# Patient Record
Sex: MALE | Race: WHITE | URBAN - METROPOLITAN AREA
[De-identification: names, ages, dates, MRNs, and addresses within clinical notes are randomized per-mention and may not be internally consistent; named-entity substitution may affect disease eponyms.]

---

## 2017-02-07 ENCOUNTER — INPATIENT (INPATIENT)
Facility: HOSPITAL | Age: 82
LOS: 7 days | Discharge: HOME | DRG: 301 | End: 2017-02-15
Attending: INTERNAL MEDICINE | Admitting: HOSPITALIST
Payer: MEDICARE

## 2017-02-07 VITALS — HEART RATE: 82 BPM | DIASTOLIC BLOOD PRESSURE: 78 MMHG | SYSTOLIC BLOOD PRESSURE: 122 MMHG | RESPIRATION RATE: 16 BRPM

## 2017-02-07 DIAGNOSIS — Z95.1 PRESENCE OF AORTOCORONARY BYPASS GRAFT: Chronic | ICD-10-CM

## 2017-02-07 LAB
ALBUMIN SERPL ELPH-MCNC: 2.8 G/DL — LOW (ref 3.3–5)
ALP SERPL-CCNC: 80 U/L — SIGNIFICANT CHANGE UP (ref 40–120)
ALT FLD-CCNC: 12 U/L RC — SIGNIFICANT CHANGE UP (ref 10–45)
ANION GAP SERPL CALC-SCNC: 12 MMOL/L — SIGNIFICANT CHANGE UP (ref 5–17)
APPEARANCE UR: CLEAR — SIGNIFICANT CHANGE UP
APTT BLD: 46 SEC — HIGH (ref 27.5–37.4)
AST SERPL-CCNC: 16 U/L — SIGNIFICANT CHANGE UP (ref 10–40)
BASOPHILS # BLD AUTO: 0 K/UL — SIGNIFICANT CHANGE UP (ref 0–0.2)
BASOPHILS NFR BLD AUTO: 0.1 % — SIGNIFICANT CHANGE UP (ref 0–2)
BILIRUB SERPL-MCNC: 0.2 MG/DL — SIGNIFICANT CHANGE UP (ref 0.2–1.2)
BILIRUB UR-MCNC: NEGATIVE — SIGNIFICANT CHANGE UP
BUN SERPL-MCNC: 24 MG/DL — HIGH (ref 7–23)
CALCIUM SERPL-MCNC: 8.7 MG/DL — SIGNIFICANT CHANGE UP (ref 8.4–10.5)
CHLORIDE SERPL-SCNC: 99 MMOL/L — SIGNIFICANT CHANGE UP (ref 96–108)
CO2 SERPL-SCNC: 24 MMOL/L — SIGNIFICANT CHANGE UP (ref 22–31)
COLOR SPEC: SIGNIFICANT CHANGE UP
CREAT SERPL-MCNC: 0.74 MG/DL — SIGNIFICANT CHANGE UP (ref 0.5–1.3)
DIFF PNL FLD: NEGATIVE — SIGNIFICANT CHANGE UP
EOSINOPHIL # BLD AUTO: 0.1 K/UL — SIGNIFICANT CHANGE UP (ref 0–0.5)
EOSINOPHIL NFR BLD AUTO: 1.6 % — SIGNIFICANT CHANGE UP (ref 0–6)
GLUCOSE SERPL-MCNC: 201 MG/DL — HIGH (ref 70–99)
GLUCOSE UR QL: NEGATIVE — SIGNIFICANT CHANGE UP
HCT VFR BLD CALC: 28 % — LOW (ref 39–50)
HGB BLD-MCNC: 9.6 G/DL — LOW (ref 13–17)
INR BLD: 3.14 RATIO — HIGH (ref 0.88–1.16)
KETONES UR-MCNC: NEGATIVE — SIGNIFICANT CHANGE UP
LEUKOCYTE ESTERASE UR-ACNC: NEGATIVE — SIGNIFICANT CHANGE UP
LYMPHOCYTES # BLD AUTO: 0.9 K/UL — LOW (ref 1–3.3)
LYMPHOCYTES # BLD AUTO: 9.8 % — LOW (ref 13–44)
MCHC RBC-ENTMCNC: 30.4 PG — SIGNIFICANT CHANGE UP (ref 27–34)
MCHC RBC-ENTMCNC: 34.3 GM/DL — SIGNIFICANT CHANGE UP (ref 32–36)
MCV RBC AUTO: 88.5 FL — SIGNIFICANT CHANGE UP (ref 80–100)
MONOCYTES # BLD AUTO: 0.4 K/UL — SIGNIFICANT CHANGE UP (ref 0–0.9)
MONOCYTES NFR BLD AUTO: 4.9 % — SIGNIFICANT CHANGE UP (ref 2–14)
NEUTROPHILS # BLD AUTO: 7.7 K/UL — HIGH (ref 1.8–7.4)
NEUTROPHILS NFR BLD AUTO: 83.6 % — HIGH (ref 43–77)
NITRITE UR-MCNC: NEGATIVE — SIGNIFICANT CHANGE UP
PH UR: 6.5 — SIGNIFICANT CHANGE UP (ref 4.8–8)
PLATELET # BLD AUTO: 254 K/UL — SIGNIFICANT CHANGE UP (ref 150–400)
POTASSIUM SERPL-MCNC: 4.4 MMOL/L — SIGNIFICANT CHANGE UP (ref 3.5–5.3)
POTASSIUM SERPL-SCNC: 4.4 MMOL/L — SIGNIFICANT CHANGE UP (ref 3.5–5.3)
PROT SERPL-MCNC: 6.6 G/DL — SIGNIFICANT CHANGE UP (ref 6–8.3)
PROT UR-MCNC: NEGATIVE — SIGNIFICANT CHANGE UP
PROTHROM AB SERPL-ACNC: 34.3 SEC — HIGH (ref 10–13.1)
RBC # BLD: 3.17 M/UL — LOW (ref 4.2–5.8)
RBC # FLD: 15.2 % — HIGH (ref 10.3–14.5)
RBC CASTS # UR COMP ASSIST: SIGNIFICANT CHANGE UP /HPF (ref 0–2)
SODIUM SERPL-SCNC: 135 MMOL/L — SIGNIFICANT CHANGE UP (ref 135–145)
SP GR SPEC: 1.01 — SIGNIFICANT CHANGE UP (ref 1.01–1.02)
UROBILINOGEN FLD QL: NEGATIVE — SIGNIFICANT CHANGE UP
WBC # BLD: 9.2 K/UL — SIGNIFICANT CHANGE UP (ref 3.8–10.5)
WBC # FLD AUTO: 9.2 K/UL — SIGNIFICANT CHANGE UP (ref 3.8–10.5)
WBC UR QL: SIGNIFICANT CHANGE UP /HPF (ref 0–5)

## 2017-02-07 PROCEDURE — 73620 X-RAY EXAM OF FOOT: CPT | Mod: 26,LT

## 2017-02-07 PROCEDURE — 71010: CPT | Mod: 26

## 2017-02-07 PROCEDURE — 93010 ELECTROCARDIOGRAM REPORT: CPT

## 2017-02-07 PROCEDURE — 99285 EMERGENCY DEPT VISIT HI MDM: CPT | Mod: 25

## 2017-02-07 RX ORDER — ACETAMINOPHEN 500 MG
975 TABLET ORAL ONCE
Qty: 0 | Refills: 0 | Status: COMPLETED | OUTPATIENT
Start: 2017-02-07 | End: 2017-02-07

## 2017-02-07 RX ADMIN — Medication 975 MILLIGRAM(S): at 13:16

## 2017-02-07 NOTE — PHYSICAL THERAPY INITIAL EVALUATION ADULT - DISCHARGE DISPOSITION, PT EVAL
Subacute Rehab. if pt is going home, pt will require assist for all funtional mobility and home PT for gait and strength improvements./rehabilitation facility

## 2017-02-07 NOTE — ED PROVIDER NOTE - ATTENDING CONTRIBUTION TO CARE
------------ATTENDING NOTE------------   88 yo M sent to ED by home visiting aide, pt d/c from rehab after L hip fx 11/17, slept in wheelchair overnight as friend slept in his bed, had urinated while asleep ("that happens often"), asymptomatic w/o complaints this AM or in ED, sent to ED as home visiting nurse "would not look at wound", having SW dora, given missed dose Acetaminophen -->  - Jacky Stoll MD   ------------------------------------------------------------------------------------- ------------ATTENDING NOTE------------   88 yo M sent to ED by home visiting aide, pt d/c from rehab after L hip fx 11/17, slept in wheelchair overnight as friend slept in his bed, had urinated while asleep ("that happens often"), asymptomatic w/o complaints this AM or in ED, sent to ED as home visiting nurse "would not look at wound", having SW eval, given missed dose Acetaminophen --> ED Sign Out 1800 pending final PT recs w/ SW recs for likely d/c home w/ continued tx.  - Jacky Stoll MD   -------------------------------------------------------------------------------------

## 2017-02-07 NOTE — ED PROVIDER NOTE - PROGRESS NOTE DETAILS
I have received sign out on this patient, briefly: 89yo M, h/o DM, HTN, CAD, recent admission (11/2016) for L hip fx s/p ORIF, discharged from  rehab few days ago, now presenting to ED for inability to ambulate at home; plan per endorsing physician: obtain SW and PT/OT consult for additional home services. PT recommends return to  rehab for strengthening, not safe for discharge home with available services (no additional services as per ).  Pt reassessed, reports having pain in the left leg: found on exam to have large necrotic heel ulceration (dry), with minimal surrounding erythema.  Podiatry paged for consultation and xrays ordered, as well as labs.  Will admit to medicine service.  -Bob podiatry evaluated, reports: stable dry gangrene, no debriedment needed at present. -Bob podiatry evaluated, reports: stable dry gangrene, no debriedment needed at present.  to be admitted to medicine service -Bob

## 2017-02-07 NOTE — PHYSICAL THERAPY INITIAL EVALUATION ADULT - ACTIVE RANGE OF MOTION EXAMINATION, REHAB EVAL
bilateral upper extremity Active ROM was WFL (within functional limits)/deficits as listed below/Right LE Active ROM was WFL (within functional limits)/LLE limited to 1/2 available ROM grossly

## 2017-02-07 NOTE — PHYSICAL THERAPY INITIAL EVALUATION ADULT - ADDITIONAL COMMENTS
This is an 89 year old who recently was DC from rehab facility. prior to that pt was living with wife in a private home 13 steps to negotiate + hand rail. prior to the injury 11/14 hip Fx pt was independent with mobility, however since that injury pt has required assist with mobility. Pt states "wasn't walking much at rehab". unclear whether this is the pt's baseline now or whether pt can rehab to become more independent with his mobility. Pt was also indepndented with ADL's prior to his injuries.

## 2017-02-07 NOTE — PHYSICAL THERAPY INITIAL EVALUATION ADULT - PERTINENT HX OF CURRENT PROBLEM, REHAB EVAL
Pt arrived to ER from home after being discharged from rehab after hip replacement.  Pt was found by VNS sitting in wheelchair.  Pt states the visiting nurse did not check the patients wound.  Pt with blackened pressure ulcer to L heel and opened dime size sore to posterior foot.  Pt UA sent, pt turned and positioned, pt aaox3, pt with foot placed on pillow and elevated.

## 2017-02-07 NOTE — ED ADULT NURSE NOTE - OBJECTIVE STATEMENT
Pt arrived to ER from home after being discharged from rehab after hip replacement.  Pt was found by VNS sitting in wheelchair.  Pt states the visiting nurse did not check the patients wound.  Pt with blackened pressure ulcer to L heel and opened dime size sore to posterior foot.  Pt UA sent, pt turned and positioned, pt aaox3, pt with foot placed on pillow and elevated.  Pt with no appropriate home care arranged as per EMS,  jovan mancia speaking to daughter and involved.

## 2017-02-07 NOTE — PHYSICAL THERAPY INITIAL EVALUATION ADULT - CRITERIA FOR SKILLED THERAPEUTIC INTERVENTIONS
anticipated equipment needs at discharge/predicted duration of therapy intervention/therapy frequency/impairments found/rehab potential/functional limitations in following categories/anticipated discharge recommendation/risk reduction/prevention

## 2017-02-07 NOTE — PHYSICAL THERAPY INITIAL EVALUATION ADULT - RANGE OF MOTION EXAMINATION, REHAB EVAL
bilateral upper extremity ROM was WFL (within functional limits)/Right LE ROM was WFL (within functional limits)/deficits as listed below/LLE limited to 1/2 available ROM grossly

## 2017-02-07 NOTE — PHYSICAL THERAPY INITIAL EVALUATION ADULT - GENERAL OBSERVATIONS, REHAB EVAL
Pt encountered in the ED, + IV lock, + wound at L MTP plantar surface doctor at bedside, states WBAT to LLE

## 2017-02-07 NOTE — ED PROVIDER NOTE - PHYSICAL EXAMINATION
Overall Well Appearing, NAD;  Symm Facies, No external signs trauma, PERRL 3mm, (-)Pallor, Anicteric, MMM;  No JVD/Bruits or stridor;  RRR w/o m/g/r;   CTAB w/o w/r/r;   Abd soft, nt/nd, +bs;  No CVAT;  No edema/calf tender;  Healing L ant shin wound;  AOX3, Normal speech, normal strength/sensation Overall Well Appearing, NAD;  Symm Facies, No external signs trauma, PERRL 3mm, (-)Pallor, Anicteric, MMM;  No JVD/Bruits or stridor;  RRR w/o m/g/r;   CTAB w/o w/r/r;   Abd soft, nt/nd, +bs;  No CVAT;  No edema/calf tender;  Healing L ant shin wound;  AOX3, Normal speech, normal strength/sensation, ambulatory w/ walker; L foot w/ healing ulcer w/ granulation w/o cellulitic changes

## 2017-02-08 DIAGNOSIS — E11.9 TYPE 2 DIABETES MELLITUS WITHOUT COMPLICATIONS: ICD-10-CM

## 2017-02-08 DIAGNOSIS — E78.5 HYPERLIPIDEMIA, UNSPECIFIED: ICD-10-CM

## 2017-02-08 DIAGNOSIS — Z65.9 PROBLEM RELATED TO UNSPECIFIED PSYCHOSOCIAL CIRCUMSTANCES: ICD-10-CM

## 2017-02-08 DIAGNOSIS — R53.81 OTHER MALAISE: ICD-10-CM

## 2017-02-08 DIAGNOSIS — I25.10 ATHEROSCLEROTIC HEART DISEASE OF NATIVE CORONARY ARTERY WITHOUT ANGINA PECTORIS: ICD-10-CM

## 2017-02-08 DIAGNOSIS — R23.4 CHANGES IN SKIN TEXTURE: ICD-10-CM

## 2017-02-08 DIAGNOSIS — R26.81 UNSTEADINESS ON FEET: ICD-10-CM

## 2017-02-08 LAB
HBA1C BLD-MCNC: 6.2 % — HIGH (ref 4–5.6)
TSH SERPL-MCNC: 1.29 UIU/ML — SIGNIFICANT CHANGE UP (ref 0.27–4.2)
VIT B12 SERPL-MCNC: 232 PG/ML — LOW (ref 243–894)
VIT D25+D1,25 OH+D1,25 PNL SERPL-MCNC: 16.7 PG/ML — LOW (ref 19.9–79.3)

## 2017-02-08 PROCEDURE — 73502 X-RAY EXAM HIP UNI 2-3 VIEWS: CPT | Mod: 26,LT

## 2017-02-08 PROCEDURE — 99223 1ST HOSP IP/OBS HIGH 75: CPT | Mod: AI

## 2017-02-08 PROCEDURE — 73552 X-RAY EXAM OF FEMUR 2/>: CPT | Mod: 26,LT

## 2017-02-08 RX ORDER — DEXTROSE 50 % IN WATER 50 %
25 SYRINGE (ML) INTRAVENOUS ONCE
Qty: 0 | Refills: 0 | Status: DISCONTINUED | OUTPATIENT
Start: 2017-02-08 | End: 2017-02-15

## 2017-02-08 RX ORDER — AMLODIPINE BESYLATE 2.5 MG/1
5 TABLET ORAL DAILY
Qty: 0 | Refills: 0 | Status: DISCONTINUED | OUTPATIENT
Start: 2017-02-08 | End: 2017-02-15

## 2017-02-08 RX ORDER — ASPIRIN/CALCIUM CARB/MAGNESIUM 324 MG
81 TABLET ORAL DAILY
Qty: 0 | Refills: 0 | Status: DISCONTINUED | OUTPATIENT
Start: 2017-02-08 | End: 2017-02-15

## 2017-02-08 RX ORDER — INSULIN LISPRO 100/ML
VIAL (ML) SUBCUTANEOUS
Qty: 0 | Refills: 0 | Status: DISCONTINUED | OUTPATIENT
Start: 2017-02-08 | End: 2017-02-15

## 2017-02-08 RX ORDER — CILOSTAZOL 100 MG/1
50 TABLET ORAL
Qty: 0 | Refills: 0 | Status: DISCONTINUED | OUTPATIENT
Start: 2017-02-08 | End: 2017-02-15

## 2017-02-08 RX ORDER — POLYETHYLENE GLYCOL 3350 17 G/17G
17 POWDER, FOR SOLUTION ORAL AT BEDTIME
Qty: 0 | Refills: 0 | Status: DISCONTINUED | OUTPATIENT
Start: 2017-02-08 | End: 2017-02-15

## 2017-02-08 RX ORDER — FINASTERIDE 5 MG/1
5 TABLET, FILM COATED ORAL DAILY
Qty: 0 | Refills: 0 | Status: DISCONTINUED | OUTPATIENT
Start: 2017-02-08 | End: 2017-02-15

## 2017-02-08 RX ORDER — GLUCAGON INJECTION, SOLUTION 0.5 MG/.1ML
1 INJECTION, SOLUTION SUBCUTANEOUS ONCE
Qty: 0 | Refills: 0 | Status: DISCONTINUED | OUTPATIENT
Start: 2017-02-08 | End: 2017-02-15

## 2017-02-08 RX ORDER — DEXTROSE 50 % IN WATER 50 %
1 SYRINGE (ML) INTRAVENOUS ONCE
Qty: 0 | Refills: 0 | Status: DISCONTINUED | OUTPATIENT
Start: 2017-02-08 | End: 2017-02-15

## 2017-02-08 RX ORDER — DOCUSATE SODIUM 100 MG
100 CAPSULE ORAL THREE TIMES A DAY
Qty: 0 | Refills: 0 | Status: DISCONTINUED | OUTPATIENT
Start: 2017-02-08 | End: 2017-02-15

## 2017-02-08 RX ORDER — ACETAMINOPHEN 500 MG
650 TABLET ORAL EVERY 6 HOURS
Qty: 0 | Refills: 0 | Status: DISCONTINUED | OUTPATIENT
Start: 2017-02-08 | End: 2017-02-15

## 2017-02-08 RX ORDER — LOSARTAN POTASSIUM 100 MG/1
50 TABLET, FILM COATED ORAL DAILY
Qty: 0 | Refills: 0 | Status: DISCONTINUED | OUTPATIENT
Start: 2017-02-08 | End: 2017-02-15

## 2017-02-08 RX ORDER — SODIUM CHLORIDE 9 MG/ML
1000 INJECTION, SOLUTION INTRAVENOUS
Qty: 0 | Refills: 0 | Status: DISCONTINUED | OUTPATIENT
Start: 2017-02-08 | End: 2017-02-15

## 2017-02-08 RX ORDER — HEPARIN SODIUM 5000 [USP'U]/ML
5000 INJECTION INTRAVENOUS; SUBCUTANEOUS EVERY 12 HOURS
Qty: 0 | Refills: 0 | Status: DISCONTINUED | OUTPATIENT
Start: 2017-02-08 | End: 2017-02-15

## 2017-02-08 RX ORDER — ATORVASTATIN CALCIUM 80 MG/1
10 TABLET, FILM COATED ORAL AT BEDTIME
Qty: 0 | Refills: 0 | Status: DISCONTINUED | OUTPATIENT
Start: 2017-02-08 | End: 2017-02-15

## 2017-02-08 RX ORDER — DEXTROSE 50 % IN WATER 50 %
12.5 SYRINGE (ML) INTRAVENOUS ONCE
Qty: 0 | Refills: 0 | Status: DISCONTINUED | OUTPATIENT
Start: 2017-02-08 | End: 2017-02-15

## 2017-02-08 RX ADMIN — HEPARIN SODIUM 5000 UNIT(S): 5000 INJECTION INTRAVENOUS; SUBCUTANEOUS at 05:26

## 2017-02-08 RX ADMIN — CILOSTAZOL 50 MILLIGRAM(S): 100 TABLET ORAL at 05:26

## 2017-02-08 RX ADMIN — AMLODIPINE BESYLATE 5 MILLIGRAM(S): 2.5 TABLET ORAL at 05:26

## 2017-02-08 RX ADMIN — Medication 1 TABLET(S): at 09:20

## 2017-02-08 RX ADMIN — HEPARIN SODIUM 5000 UNIT(S): 5000 INJECTION INTRAVENOUS; SUBCUTANEOUS at 19:46

## 2017-02-08 RX ADMIN — Medication 6: at 19:42

## 2017-02-08 RX ADMIN — Medication 100 MILLIGRAM(S): at 05:26

## 2017-02-08 RX ADMIN — ATORVASTATIN CALCIUM 10 MILLIGRAM(S): 80 TABLET, FILM COATED ORAL at 22:25

## 2017-02-08 RX ADMIN — Medication 81 MILLIGRAM(S): at 09:20

## 2017-02-08 RX ADMIN — Medication 100 MILLIGRAM(S): at 19:43

## 2017-02-08 RX ADMIN — FINASTERIDE 5 MILLIGRAM(S): 5 TABLET, FILM COATED ORAL at 09:20

## 2017-02-08 RX ADMIN — CILOSTAZOL 50 MILLIGRAM(S): 100 TABLET ORAL at 19:43

## 2017-02-08 RX ADMIN — POLYETHYLENE GLYCOL 3350 17 GRAM(S): 17 POWDER, FOR SOLUTION ORAL at 22:24

## 2017-02-08 RX ADMIN — LOSARTAN POTASSIUM 50 MILLIGRAM(S): 100 TABLET, FILM COATED ORAL at 05:26

## 2017-02-08 NOTE — H&P ADULT. - HISTORY OF PRESENT ILLNESS
89 man CAD, dm, hld, htn, bph s/p l.hip fracture and ORIF 11/16, s/p rehab stay and sent home now readmitted with difficulty walking and pain in hip.  On attempting to wake pt for interview - he would open eyes, answer no to questions about pain and say "I am tired" and go back to sleep.  RN at bedside, state pt has been oriented and conversant throughout night, was just changes 1 hour prior to my arrival without difficulty and foam placed on sacral wound.    On reviewing ED notes - pt was found sitting in wheelchair soiled by VNS and sent to ED.  Prior notes indicate pt is  and lives with spouse.  In Ed also noted large necrotic heel ulcer and called podiatry.  No intervention done.    /88, hr 70, temp 36.5, sat 98, rr 18.  Given tylenol 975mg x 1

## 2017-02-08 NOTE — H&P ADULT. - PROBLEM SELECTOR PLAN 6
pt documents humalog 15 U bid at home which is an unusual long term regimen  iss started and will assess FSs for trend and efficacy

## 2017-02-08 NOTE — H&P ADULT. - PROBLEM SELECTOR PROBLEM 4
Coronary artery disease without angina pectoris, unspecified vessel or lesion type, unspecified whether native or transplanted heart

## 2017-02-08 NOTE — H&P ADULT. - PROBLEM SELECTOR PLAN 1
pt living with wife but unclear if home is safe  pt found incontinent, needs assistance with ADLs  ADRIA john for safe home

## 2017-02-08 NOTE — ED ADULT NURSE REASSESSMENT NOTE - NS ED NURSE REASSESS COMMENT FT1
Pt changed after urinary incontinence episode.  Pt turned, pressure ulcer changed (foam).  Heels elevated off bed with pillow and linens changed.

## 2017-02-08 NOTE — DIETITIAN INITIAL EVALUATION ADULT. - ENERGY NEEDS
IBW= 172 lbs +/-10%      HT 5'11"      WT   172 lbs  (as stated)   Chart review: pt readmitted from home with  hip pain; eschar of foot

## 2017-02-08 NOTE — H&P ADULT. - ASSESSMENT
[age] man s/p hip fracture/orif/rehab stay now at home with developing wounds and inability to care for self

## 2017-02-08 NOTE — DIETITIAN INITIAL EVALUATION ADULT. - OTHER INFO
pt visited at bedside states his appetite is good. Pt claims He "eats everything". Weight loss occurred  during rehab stay but patient has been stable for past few months. Pt checks his fingersticks 2x/day and uses a Diabetic insulin Pen. Pt avoids all sugary foods, drinking his coffee black.. Pt lives with his wife but uncertain if she is able to cook for patient as noted in H&P pt requires assistance with ADL. pt refuses glucerna supplement, but agres with MVI. pt also states the sacral wound is improving becauses he "turns off it , alternating sides".

## 2017-02-09 LAB
APTT BLD: 41.5 SEC — HIGH (ref 27.5–37.4)
HBA1C BLD-MCNC: 6.3 % — HIGH (ref 4–5.6)
HCT VFR BLD CALC: 27.9 % — LOW (ref 39–50)
HGB BLD-MCNC: 8.6 G/DL — LOW (ref 13–17)
INR BLD: 2.31 RATIO — HIGH (ref 0.88–1.16)
MCHC RBC-ENTMCNC: 27.2 PG — SIGNIFICANT CHANGE UP (ref 27–34)
MCHC RBC-ENTMCNC: 30.8 GM/DL — LOW (ref 32–36)
MCV RBC AUTO: 88.3 FL — SIGNIFICANT CHANGE UP (ref 80–100)
PLATELET # BLD AUTO: 288 K/UL — SIGNIFICANT CHANGE UP (ref 150–400)
PROTHROM AB SERPL-ACNC: 26.3 SEC — HIGH (ref 10–13.1)
RBC # BLD: 3.16 M/UL — LOW (ref 4.2–5.8)
RBC # FLD: 15.6 % — HIGH (ref 10.3–14.5)
WBC # BLD: 8.71 K/UL — SIGNIFICANT CHANGE UP (ref 3.8–10.5)
WBC # FLD AUTO: 8.71 K/UL — SIGNIFICANT CHANGE UP (ref 3.8–10.5)

## 2017-02-09 PROCEDURE — 99024 POSTOP FOLLOW-UP VISIT: CPT

## 2017-02-09 PROCEDURE — 99232 SBSQ HOSP IP/OBS MODERATE 35: CPT

## 2017-02-09 RX ORDER — ASCORBIC ACID 60 MG
500 TABLET,CHEWABLE ORAL DAILY
Qty: 0 | Refills: 0 | Status: DISCONTINUED | OUTPATIENT
Start: 2017-02-09 | End: 2017-02-15

## 2017-02-09 RX ADMIN — CILOSTAZOL 50 MILLIGRAM(S): 100 TABLET ORAL at 17:14

## 2017-02-09 RX ADMIN — Medication 1 TABLET(S): at 12:42

## 2017-02-09 RX ADMIN — LOSARTAN POTASSIUM 50 MILLIGRAM(S): 100 TABLET, FILM COATED ORAL at 06:17

## 2017-02-09 RX ADMIN — CILOSTAZOL 50 MILLIGRAM(S): 100 TABLET ORAL at 06:17

## 2017-02-09 RX ADMIN — Medication 81 MILLIGRAM(S): at 12:42

## 2017-02-09 RX ADMIN — ATORVASTATIN CALCIUM 10 MILLIGRAM(S): 80 TABLET, FILM COATED ORAL at 21:46

## 2017-02-09 RX ADMIN — Medication 100 MILLIGRAM(S): at 21:46

## 2017-02-09 RX ADMIN — HEPARIN SODIUM 5000 UNIT(S): 5000 INJECTION INTRAVENOUS; SUBCUTANEOUS at 17:14

## 2017-02-09 RX ADMIN — Medication 100 MILLIGRAM(S): at 06:31

## 2017-02-09 RX ADMIN — POLYETHYLENE GLYCOL 3350 17 GRAM(S): 17 POWDER, FOR SOLUTION ORAL at 21:46

## 2017-02-09 RX ADMIN — Medication 500 MILLIGRAM(S): at 12:42

## 2017-02-09 RX ADMIN — AMLODIPINE BESYLATE 5 MILLIGRAM(S): 2.5 TABLET ORAL at 06:31

## 2017-02-09 RX ADMIN — Medication 2: at 12:41

## 2017-02-09 RX ADMIN — Medication 2: at 17:14

## 2017-02-09 RX ADMIN — Medication 100 MILLIGRAM(S): at 12:42

## 2017-02-09 RX ADMIN — FINASTERIDE 5 MILLIGRAM(S): 5 TABLET, FILM COATED ORAL at 12:41

## 2017-02-09 RX ADMIN — Medication 650 MILLIGRAM(S): at 01:05

## 2017-02-09 RX ADMIN — Medication 650 MILLIGRAM(S): at 00:35

## 2017-02-09 RX ADMIN — HEPARIN SODIUM 5000 UNIT(S): 5000 INJECTION INTRAVENOUS; SUBCUTANEOUS at 06:17

## 2017-02-10 ENCOUNTER — TRANSCRIPTION ENCOUNTER (OUTPATIENT)
Age: 82
End: 2017-02-10

## 2017-02-10 LAB
APTT BLD: 36.8 SEC — SIGNIFICANT CHANGE UP (ref 27.5–37.4)
HCT VFR BLD CALC: 27.9 % — LOW (ref 39–50)
HGB BLD-MCNC: 8.6 G/DL — LOW (ref 13–17)
INR BLD: 1.79 RATIO — HIGH (ref 0.88–1.16)
MCHC RBC-ENTMCNC: 27 PG — SIGNIFICANT CHANGE UP (ref 27–34)
MCHC RBC-ENTMCNC: 30.8 GM/DL — LOW (ref 32–36)
MCV RBC AUTO: 87.7 FL — SIGNIFICANT CHANGE UP (ref 80–100)
PLATELET # BLD AUTO: 279 K/UL — SIGNIFICANT CHANGE UP (ref 150–400)
PROTHROM AB SERPL-ACNC: 20.4 SEC — HIGH (ref 10–13.1)
RBC # BLD: 3.18 M/UL — LOW (ref 4.2–5.8)
RBC # FLD: 15.6 % — HIGH (ref 10.3–14.5)
WBC # BLD: 8.59 K/UL — SIGNIFICANT CHANGE UP (ref 3.8–10.5)
WBC # FLD AUTO: 8.59 K/UL — SIGNIFICANT CHANGE UP (ref 3.8–10.5)

## 2017-02-10 PROCEDURE — 99239 HOSP IP/OBS DSCHRG MGMT >30: CPT

## 2017-02-10 RX ADMIN — Medication 1 TABLET(S): at 11:42

## 2017-02-10 RX ADMIN — Medication 6: at 11:47

## 2017-02-10 RX ADMIN — CILOSTAZOL 50 MILLIGRAM(S): 100 TABLET ORAL at 05:41

## 2017-02-10 RX ADMIN — Medication 100 MILLIGRAM(S): at 13:23

## 2017-02-10 RX ADMIN — Medication 2: at 10:11

## 2017-02-10 RX ADMIN — Medication 500 MILLIGRAM(S): at 11:42

## 2017-02-10 RX ADMIN — Medication 81 MILLIGRAM(S): at 11:42

## 2017-02-10 RX ADMIN — FINASTERIDE 5 MILLIGRAM(S): 5 TABLET, FILM COATED ORAL at 11:42

## 2017-02-10 RX ADMIN — HEPARIN SODIUM 5000 UNIT(S): 5000 INJECTION INTRAVENOUS; SUBCUTANEOUS at 17:53

## 2017-02-10 RX ADMIN — Medication 100 MILLIGRAM(S): at 22:42

## 2017-02-10 RX ADMIN — AMLODIPINE BESYLATE 5 MILLIGRAM(S): 2.5 TABLET ORAL at 05:41

## 2017-02-10 RX ADMIN — Medication 650 MILLIGRAM(S): at 02:25

## 2017-02-10 RX ADMIN — Medication 6: at 17:51

## 2017-02-10 RX ADMIN — POLYETHYLENE GLYCOL 3350 17 GRAM(S): 17 POWDER, FOR SOLUTION ORAL at 22:42

## 2017-02-10 RX ADMIN — Medication 100 MILLIGRAM(S): at 05:41

## 2017-02-10 RX ADMIN — HEPARIN SODIUM 5000 UNIT(S): 5000 INJECTION INTRAVENOUS; SUBCUTANEOUS at 05:42

## 2017-02-10 RX ADMIN — Medication 650 MILLIGRAM(S): at 02:55

## 2017-02-10 RX ADMIN — ATORVASTATIN CALCIUM 10 MILLIGRAM(S): 80 TABLET, FILM COATED ORAL at 22:42

## 2017-02-10 RX ADMIN — CILOSTAZOL 50 MILLIGRAM(S): 100 TABLET ORAL at 17:53

## 2017-02-10 RX ADMIN — LOSARTAN POTASSIUM 50 MILLIGRAM(S): 100 TABLET, FILM COATED ORAL at 05:41

## 2017-02-10 NOTE — DISCHARGE NOTE ADULT - MEDICATION SUMMARY - MEDICATIONS TO STOP TAKING
I will STOP taking the medications listed below when I get home from the hospital:    acetaminophen 325 mg oral tablet  -- 3 tab(s) by mouth every 8 hours    HumaLOG KwikPen 100 units/mL subcutaneous solution  -- 15 unit(s) subcutaneous 2 times a day (before breakfast and before dinner).  While in the hospital patient was on a insulin sliding scale.    oxyCODONE 5 mg oral tablet  -- 1 tab(s) by mouth every 4 hours, As needed, Severe Pain (7 - 10) I will STOP taking the medications listed below when I get home from the hospital:    amLODIPine 5 mg oral tablet  -- 1 tab(s) by mouth once a day Verified by [facility] [phone]    acetaminophen 325 mg oral tablet  -- 3 tab(s) by mouth every 8 hours    HumaLOG KwikPen 100 units/mL subcutaneous solution  -- 15 unit(s) subcutaneous 2 times a day (before breakfast and before dinner).  While in the hospital patient was on a insulin sliding scale.    oxyCODONE 5 mg oral tablet  -- 1 tab(s) by mouth every 4 hours, As needed, Severe Pain (7 - 10) I will STOP taking the medications listed below when I get home from the hospital:    amLODIPine 5 mg oral tablet  -- 1 tab(s) by mouth once a day Verified by [facility] [phone]    acetaminophen 325 mg oral tablet  -- 3 tab(s) by mouth every 8 hours    HumaLOG KwikPen 100 units/mL subcutaneous solution  -- 15 unit(s) subcutaneous 2 times a day (before breakfast and before dinner).  While in the hospital patient was on a insulin sliding scale.    oxyCODONE 5 mg oral tablet  -- 1 tab(s) by mouth every 4 hours, As needed, Severe Pain (7 - 10)

## 2017-02-10 NOTE — DISCHARGE NOTE ADULT - MEDICATION SUMMARY - MEDICATIONS TO TAKE
I will START or STAY ON the medications listed below when I get home from the hospital:    Proscar 5 mg oral tablet  -- 1 tab(s) by mouth once a day Verified by [facility] [phone]  -- Indication: For Prostate    acetaminophen 325 mg oral tablet  -- 3 tab(s) by mouth every 8 hours  -- Indication: For Pain    aspirin 81 mg oral tablet  -- 1 tab(s) by mouth once a day    -- Indication: For Prevent heart attack    losartan 50 mg oral tablet  -- 1 tab(s) by mouth once a day  -- Indication: For Blood pressure    metFORMIN 500 mg oral tablet  -- 1 tab(s) by mouth 2 times a day  -- Indication: For Diabetes    Lipitor 10 mg oral tablet  -- 1 tab(s) by mouth once a day Verified by [facility] [phone]  -- Indication: For High cholesterol    Hyzaar 50 mg-12.5 mg oral tablet  -- 1 tab(s) by mouth once a day. Verified by [facility] [phone]  -- Indication: For Blood pressure    amLODIPine 5 mg oral tablet  -- 1 tab(s) by mouth once a day Verified by [facility] [phone]  -- Indication: For Blood pressure    docusate sodium 100 mg oral capsule  -- 1 cap(s) by mouth 3 times a day  -- Indication: For Constipation    polyethylene glycol 3350 oral powder for reconstitution  -- 17 gram(s) by mouth once a day (at bedtime)  -- Indication: For Constipation    Pletal 50 mg oral tablet  -- 1 tab(s) by mouth 2 times a day. Verified by [facility] [phone]  -- Indication: For Peripheral vascular disease    Multiple Vitamins oral tablet  -- 1 tab(s) by mouth once a day  -- Indication: For Vitamin    ascorbic acid 500 mg oral tablet  -- 1 tab(s) by mouth 2 times a day  -- Indication: For Vitamin    folic acid 1 mg oral tablet  -- 1 tab(s) by mouth once a day  -- Indication: For Vitamin I will START or STAY ON the medications listed below when I get home from the hospital:    Proscar 5 mg oral tablet  -- 1 tab(s) by mouth once a day Verified by [facility] [phone]  -- Indication: For Prostate    acetaminophen 325 mg oral tablet  -- 3 tab(s) by mouth every 8 hours  -- Indication: For Pain    aspirin 81 mg oral tablet  -- 1 tab(s) by mouth once a day    -- Indication: For Prevent heart attack    losartan 50 mg oral tablet  -- 1 tab(s) by mouth once a day  -- Indication: For Blood pressure    insulin lispro 100 units/mL subcutaneous solution  -- 12 unit(s) subcutaneous once a day before breakfast & dinner  -- Indication: For Type 2 diabetes mellitus without complication, with long-term current use of insulin    Lantus 100 units/mL subcutaneous solution  -- 5 unit(s) subcutaneous once a day (at bedtime)  -- Indication: For Type 2 diabetes mellitus without complication, with long-term current use of insulin    Lipitor 10 mg oral tablet  -- 1 tab(s) by mouth once a day Verified by [facility] [phone]  -- Indication: For High cholesterol    Hyzaar 50 mg-12.5 mg oral tablet  -- 1 tab(s) by mouth once a day. Verified by [facility] [phone]  -- Indication: For Blood pressure    amLODIPine 5 mg oral tablet  -- 1 tab(s) by mouth once a day Verified by [facility] [phone]  -- Indication: For Blood pressure    petrolatum topical ointment  -- 1 application on skin once a day to both legs  -- Indication: For Dry skin    docusate sodium 100 mg oral capsule  -- 1 cap(s) by mouth 3 times a day  -- Indication: For Constipation    polyethylene glycol 3350 oral powder for reconstitution  -- 17 gram(s) by mouth once a day (at bedtime)  -- Indication: For Constipation    Pletal 50 mg oral tablet  -- 1 tab(s) by mouth 2 times a day. Verified by [facility] [phone]  -- Indication: For Peripheral vascular disease    dextromethorphan-guaiFENesin 10 mg-100 mg/5 mL oral liquid  -- 10 milliliter(s) by mouth every 6 hours, As needed, Cough  -- Indication: For Cough    Multiple Vitamins oral tablet  -- 1 tab(s) by mouth once a day  -- Indication: For Vitamin    ascorbic acid 500 mg oral tablet  -- 1 tab(s) by mouth 2 times a day  -- Indication: For Vitamin    folic acid 1 mg oral tablet  -- 1 tab(s) by mouth once a day  -- Indication: For Vitamin I will START or STAY ON the medications listed below when I get home from the hospital:    Proscar 5 mg oral tablet  -- 1 tab(s) by mouth once a day Verified by [facility] [phone]  -- Indication: For Enlarge prostate    acetaminophen 325 mg oral tablet  -- 3 tab(s) by mouth every 8 hours  -- Indication: For Pain    aspirin 81 mg oral tablet  -- 1 tab(s) by mouth once a day    -- Indication: For Cardiac prophylaxis    losartan 50 mg oral tablet  -- 1 tab(s) by mouth once a day  -- Indication: For High BP    insulin lispro 100 units/mL subcutaneous solution  -- 12 unit(s) subcutaneous once a day before breakfast & dinner  -- Indication: For Type 2 diabetes mellitus without complication, with long-term current use of insulin    Lantus 100 units/mL subcutaneous solution  -- 5 unit(s) subcutaneous once a day (at bedtime)  -- Indication: For Type 2 diabetes mellitus without complication, with long-term current use of insulin    Lipitor 10 mg oral tablet  -- 1 tab(s) by mouth once a day Verified by [facility] [phone]  -- Indication: For High cholesterol    Hyzaar 50 mg-12.5 mg oral tablet  -- 1 tab(s) by mouth once a day. Verified by [facility] [phone]  -- Indication: For High BP    amLODIPine 5 mg oral tablet  -- 1 tab(s) by mouth once a day Verified by [facility] [phone]  -- Indication: For High BP    petrolatum topical ointment  -- 1 application on skin once a day to both legs  -- Indication: For Dry skin    docusate sodium 100 mg oral capsule  -- 1 cap(s) by mouth 3 times a day  -- Indication: For Constipation    polyethylene glycol 3350 oral powder for reconstitution  -- 17 gram(s) by mouth once a day (at bedtime)  -- Indication: For Constipation    Pletal 50 mg oral tablet  -- 1 tab(s) by mouth 2 times a day. Verified by [facility] [phone]  -- Indication: For Peripheral vascular disease    dextromethorphan-guaiFENesin 10 mg-100 mg/5 mL oral liquid  -- 10 milliliter(s) by mouth every 6 hours, As needed, Cough  -- Indication: For Cough    Multiple Vitamins oral tablet  -- 1 tab(s) by mouth once a day  -- Indication: For Vitamin    ascorbic acid 500 mg oral tablet  -- 1 tab(s) by mouth 2 times a day  -- Indication: For Vitamin    folic acid 1 mg oral tablet  -- 1 tab(s) by mouth once a day  -- Indication: For Vitamin

## 2017-02-10 NOTE — DISCHARGE NOTE ADULT - PATIENT PORTAL LINK FT
“You can access the [facility] Patient Portal, offered by [facility], by registering with the following website: [email or web address]”

## 2017-02-10 NOTE — DISCHARGE NOTE ADULT - HOSPITAL COURSE
89 yo man with PMH of recent hip fx s/p IMN 11/16, HTN, DM2, HLD, CAD a/w inability to walk.  [facility] went home with plan for H[facility] but aide fell through, ended up back in the hospital here for inability to walk and take care of himself.  Found to have black eschar of L heel - seen by podiatry, not thought to be infected, just a pressure ulcer - needs PT and dry dressing.  Seen by ortho for inability to walk so far out from hip surgery - x-rays fine, no issue with the hip per ortho.  Pt was seen by PT who recommended rehab.  Noted to have A1C 6.2% so insulin stopped and put only on metformin. 89 yo man with PMH of recent hip fx s/p IMN 11/16, HTN, DM2, HLD, CAD a/w inability to walk.  [facility] went home with plan for [facility]HA but aide fell through, ended up back in the hospital here for inability to walk and take care of himself.  Found to have black eschar of L heel - seen by podiatry, not thought to be infected, just a pressure ulcer - needs PT and dry dressing.  Seen by ortho for inability to walk so far out from hip surgery - x-rays fine, no issue with the hip per ortho.  Pt was seen by PT who recommended rehab.  Noted to have A1C 6.2% so insulin stopped and put only on metformin. Patient was accepted to [facility] in [facility]. He will be discharged to rehab in stable condition.

## 2017-02-10 NOTE — DISCHARGE NOTE ADULT - PLAN OF CARE
Able to walk independently and/or with device and able to perform ADL's independently You had hip surgery in November 2016 and had short term Rehab after.  You have been recommended for further in-patient Rehab for gait retraining.  Follow up with your primary healthcare provider after discharge from Rehab.  Follow up with Orthopedic surgeon as previously instructed. Follow up with Podiatry in 1 week.  Call for appointment.  Wound care to left heel 2x/day as follow:  Cleanse with normal saline solution; pat dry; apply protective barrier; cover with dry gauze HgA1C this admission 6.3  Make sure you get your HgA1c checked every three months.  If you take oral diabetes medications, check your blood glucose two times a day.  If you take insulin, check your blood glucose before meals and at bedtime.  It's important not to skip any meals.  Keep a log of your blood glucose results and always take it with you to your doctor appointments.  Keep a list of your current medications including injectables and over the counter medications and bring this medication list with you to all your doctor appointments.  If you have not seen your ophthalmologist this year call for appointment.  Check your feet daily for redness, sores, or openings. Do not self treat. If no improvement in two days call your primary care physician for an appointment. Follow up with Podiatry in 1 week.  Call for appointment.  Wound care to left heel daily as follow:  Cleanse with normal saline solution; pat dry; paint with betadine; cover with dry sterile dressing  Follow up with Podiatry (Dr Kessler) in 1 week.  Call [phone] for appointment.

## 2017-02-11 PROCEDURE — 99232 SBSQ HOSP IP/OBS MODERATE 35: CPT

## 2017-02-11 RX ORDER — PETROLATUM,WHITE
1 JELLY (GRAM) TOPICAL DAILY
Qty: 0 | Refills: 0 | Status: DISCONTINUED | OUTPATIENT
Start: 2017-02-11 | End: 2017-02-15

## 2017-02-11 RX ORDER — INSULIN GLARGINE 100 [IU]/ML
5 INJECTION, SOLUTION SUBCUTANEOUS AT BEDTIME
Qty: 0 | Refills: 0 | Status: DISCONTINUED | OUTPATIENT
Start: 2017-02-11 | End: 2017-02-15

## 2017-02-11 RX ADMIN — INSULIN GLARGINE 5 UNIT(S): 100 INJECTION, SOLUTION SUBCUTANEOUS at 22:00

## 2017-02-11 RX ADMIN — Medication 1 APPLICATION(S): at 13:23

## 2017-02-11 RX ADMIN — CILOSTAZOL 50 MILLIGRAM(S): 100 TABLET ORAL at 05:20

## 2017-02-11 RX ADMIN — Medication 81 MILLIGRAM(S): at 13:26

## 2017-02-11 RX ADMIN — FINASTERIDE 5 MILLIGRAM(S): 5 TABLET, FILM COATED ORAL at 13:27

## 2017-02-11 RX ADMIN — Medication 1 TABLET(S): at 13:27

## 2017-02-11 RX ADMIN — AMLODIPINE BESYLATE 5 MILLIGRAM(S): 2.5 TABLET ORAL at 05:20

## 2017-02-11 RX ADMIN — Medication 6: at 18:15

## 2017-02-11 RX ADMIN — LOSARTAN POTASSIUM 50 MILLIGRAM(S): 100 TABLET, FILM COATED ORAL at 05:20

## 2017-02-11 RX ADMIN — HEPARIN SODIUM 5000 UNIT(S): 5000 INJECTION INTRAVENOUS; SUBCUTANEOUS at 05:19

## 2017-02-11 RX ADMIN — CILOSTAZOL 50 MILLIGRAM(S): 100 TABLET ORAL at 18:17

## 2017-02-11 RX ADMIN — Medication 100 MILLIGRAM(S): at 05:20

## 2017-02-11 RX ADMIN — Medication 8: at 13:22

## 2017-02-11 RX ADMIN — HEPARIN SODIUM 5000 UNIT(S): 5000 INJECTION INTRAVENOUS; SUBCUTANEOUS at 18:17

## 2017-02-11 RX ADMIN — Medication 2: at 08:46

## 2017-02-11 RX ADMIN — POLYETHYLENE GLYCOL 3350 17 GRAM(S): 17 POWDER, FOR SOLUTION ORAL at 22:00

## 2017-02-11 RX ADMIN — ATORVASTATIN CALCIUM 10 MILLIGRAM(S): 80 TABLET, FILM COATED ORAL at 22:00

## 2017-02-11 RX ADMIN — Medication 100 MILLIGRAM(S): at 23:24

## 2017-02-11 RX ADMIN — Medication 100 MILLIGRAM(S): at 22:00

## 2017-02-11 RX ADMIN — Medication 100 MILLIGRAM(S): at 13:25

## 2017-02-11 RX ADMIN — Medication 500 MILLIGRAM(S): at 13:24

## 2017-02-12 LAB
ANION GAP SERPL CALC-SCNC: 19 MMOL/L — HIGH (ref 5–17)
BUN SERPL-MCNC: 30 MG/DL — HIGH (ref 7–23)
CALCIUM SERPL-MCNC: 9.5 MG/DL — SIGNIFICANT CHANGE UP (ref 8.4–10.5)
CHLORIDE SERPL-SCNC: 100 MMOL/L — SIGNIFICANT CHANGE UP (ref 96–108)
CO2 SERPL-SCNC: 20 MMOL/L — LOW (ref 22–31)
CREAT SERPL-MCNC: 0.99 MG/DL — SIGNIFICANT CHANGE UP (ref 0.5–1.3)
GLUCOSE SERPL-MCNC: 137 MG/DL — HIGH (ref 70–99)
HCT VFR BLD CALC: 28.2 % — LOW (ref 39–50)
HGB BLD-MCNC: 8.9 G/DL — LOW (ref 13–17)
INR BLD: 1.24 RATIO — HIGH (ref 0.88–1.16)
MCHC RBC-ENTMCNC: 27.7 PG — SIGNIFICANT CHANGE UP (ref 27–34)
MCHC RBC-ENTMCNC: 31.6 GM/DL — LOW (ref 32–36)
MCV RBC AUTO: 87.9 FL — SIGNIFICANT CHANGE UP (ref 80–100)
PLATELET # BLD AUTO: 291 K/UL — SIGNIFICANT CHANGE UP (ref 150–400)
POTASSIUM SERPL-MCNC: 4.6 MMOL/L — SIGNIFICANT CHANGE UP (ref 3.5–5.3)
POTASSIUM SERPL-SCNC: 4.6 MMOL/L — SIGNIFICANT CHANGE UP (ref 3.5–5.3)
PROTHROM AB SERPL-ACNC: 14 SEC — HIGH (ref 10–13.1)
RBC # BLD: 3.21 M/UL — LOW (ref 4.2–5.8)
RBC # FLD: 15.6 % — HIGH (ref 10.3–14.5)
SODIUM SERPL-SCNC: 139 MMOL/L — SIGNIFICANT CHANGE UP (ref 135–145)
WBC # BLD: 9.87 K/UL — SIGNIFICANT CHANGE UP (ref 3.8–10.5)
WBC # FLD AUTO: 9.87 K/UL — SIGNIFICANT CHANGE UP (ref 3.8–10.5)

## 2017-02-12 PROCEDURE — 99233 SBSQ HOSP IP/OBS HIGH 50: CPT

## 2017-02-12 RX ORDER — INSULIN LISPRO 100/ML
VIAL (ML) SUBCUTANEOUS AT BEDTIME
Qty: 0 | Refills: 0 | Status: DISCONTINUED | OUTPATIENT
Start: 2017-02-12 | End: 2017-02-15

## 2017-02-12 RX ORDER — INSULIN LISPRO 100/ML
10 VIAL (ML) SUBCUTANEOUS
Qty: 0 | Refills: 0 | Status: DISCONTINUED | OUTPATIENT
Start: 2017-02-12 | End: 2017-02-13

## 2017-02-12 RX ADMIN — Medication 1 APPLICATION(S): at 12:26

## 2017-02-12 RX ADMIN — ATORVASTATIN CALCIUM 10 MILLIGRAM(S): 80 TABLET, FILM COATED ORAL at 21:22

## 2017-02-12 RX ADMIN — Medication 1 TABLET(S): at 12:26

## 2017-02-12 RX ADMIN — Medication 4: at 12:27

## 2017-02-12 RX ADMIN — CILOSTAZOL 50 MILLIGRAM(S): 100 TABLET ORAL at 05:22

## 2017-02-12 RX ADMIN — Medication 100 MILLIGRAM(S): at 14:22

## 2017-02-12 RX ADMIN — POLYETHYLENE GLYCOL 3350 17 GRAM(S): 17 POWDER, FOR SOLUTION ORAL at 21:22

## 2017-02-12 RX ADMIN — Medication 100 MILLIGRAM(S): at 05:22

## 2017-02-12 RX ADMIN — INSULIN GLARGINE 5 UNIT(S): 100 INJECTION, SOLUTION SUBCUTANEOUS at 21:27

## 2017-02-12 RX ADMIN — Medication 81 MILLIGRAM(S): at 12:26

## 2017-02-12 RX ADMIN — FINASTERIDE 5 MILLIGRAM(S): 5 TABLET, FILM COATED ORAL at 12:27

## 2017-02-12 RX ADMIN — Medication 500 MILLIGRAM(S): at 12:26

## 2017-02-12 RX ADMIN — AMLODIPINE BESYLATE 5 MILLIGRAM(S): 2.5 TABLET ORAL at 05:22

## 2017-02-12 RX ADMIN — Medication 100 MILLIGRAM(S): at 21:22

## 2017-02-12 RX ADMIN — LOSARTAN POTASSIUM 50 MILLIGRAM(S): 100 TABLET, FILM COATED ORAL at 05:22

## 2017-02-12 RX ADMIN — CILOSTAZOL 50 MILLIGRAM(S): 100 TABLET ORAL at 18:25

## 2017-02-12 RX ADMIN — HEPARIN SODIUM 5000 UNIT(S): 5000 INJECTION INTRAVENOUS; SUBCUTANEOUS at 05:22

## 2017-02-12 RX ADMIN — Medication 4: at 18:26

## 2017-02-12 RX ADMIN — HEPARIN SODIUM 5000 UNIT(S): 5000 INJECTION INTRAVENOUS; SUBCUTANEOUS at 18:25

## 2017-02-13 LAB
ANION GAP SERPL CALC-SCNC: 17 MMOL/L — SIGNIFICANT CHANGE UP (ref 5–17)
BUN SERPL-MCNC: 42 MG/DL — HIGH (ref 7–23)
CALCIUM SERPL-MCNC: 9.3 MG/DL — SIGNIFICANT CHANGE UP (ref 8.4–10.5)
CHLORIDE SERPL-SCNC: 97 MMOL/L — SIGNIFICANT CHANGE UP (ref 96–108)
CO2 SERPL-SCNC: 21 MMOL/L — LOW (ref 22–31)
CREAT SERPL-MCNC: 1.24 MG/DL — SIGNIFICANT CHANGE UP (ref 0.5–1.3)
GLUCOSE SERPL-MCNC: 135 MG/DL — HIGH (ref 70–99)
POTASSIUM SERPL-MCNC: 4.7 MMOL/L — SIGNIFICANT CHANGE UP (ref 3.5–5.3)
POTASSIUM SERPL-SCNC: 4.7 MMOL/L — SIGNIFICANT CHANGE UP (ref 3.5–5.3)
SODIUM SERPL-SCNC: 135 MMOL/L — SIGNIFICANT CHANGE UP (ref 135–145)

## 2017-02-13 PROCEDURE — 99232 SBSQ HOSP IP/OBS MODERATE 35: CPT

## 2017-02-13 RX ORDER — INSULIN LISPRO 100/ML
12 VIAL (ML) SUBCUTANEOUS
Qty: 0 | Refills: 0 | Status: DISCONTINUED | OUTPATIENT
Start: 2017-02-13 | End: 2017-02-15

## 2017-02-13 RX ADMIN — Medication 100 MILLIGRAM(S): at 22:16

## 2017-02-13 RX ADMIN — Medication 81 MILLIGRAM(S): at 12:10

## 2017-02-13 RX ADMIN — Medication 4: at 18:09

## 2017-02-13 RX ADMIN — HEPARIN SODIUM 5000 UNIT(S): 5000 INJECTION INTRAVENOUS; SUBCUTANEOUS at 05:01

## 2017-02-13 RX ADMIN — Medication 12 UNIT(S): at 18:06

## 2017-02-13 RX ADMIN — Medication 100 MILLIGRAM(S): at 14:10

## 2017-02-13 RX ADMIN — HEPARIN SODIUM 5000 UNIT(S): 5000 INJECTION INTRAVENOUS; SUBCUTANEOUS at 18:06

## 2017-02-13 RX ADMIN — Medication 500 MILLIGRAM(S): at 12:11

## 2017-02-13 RX ADMIN — CILOSTAZOL 50 MILLIGRAM(S): 100 TABLET ORAL at 18:03

## 2017-02-13 RX ADMIN — CILOSTAZOL 50 MILLIGRAM(S): 100 TABLET ORAL at 05:01

## 2017-02-13 RX ADMIN — LOSARTAN POTASSIUM 50 MILLIGRAM(S): 100 TABLET, FILM COATED ORAL at 05:01

## 2017-02-13 RX ADMIN — Medication 650 MILLIGRAM(S): at 01:30

## 2017-02-13 RX ADMIN — INSULIN GLARGINE 5 UNIT(S): 100 INJECTION, SOLUTION SUBCUTANEOUS at 22:16

## 2017-02-13 RX ADMIN — POLYETHYLENE GLYCOL 3350 17 GRAM(S): 17 POWDER, FOR SOLUTION ORAL at 22:16

## 2017-02-13 RX ADMIN — Medication 2: at 14:09

## 2017-02-13 RX ADMIN — FINASTERIDE 5 MILLIGRAM(S): 5 TABLET, FILM COATED ORAL at 12:11

## 2017-02-13 RX ADMIN — Medication 650 MILLIGRAM(S): at 20:25

## 2017-02-13 RX ADMIN — Medication 10 UNIT(S): at 08:57

## 2017-02-13 RX ADMIN — Medication 650 MILLIGRAM(S): at 00:59

## 2017-02-13 RX ADMIN — ATORVASTATIN CALCIUM 10 MILLIGRAM(S): 80 TABLET, FILM COATED ORAL at 22:16

## 2017-02-13 RX ADMIN — Medication 1 TABLET(S): at 12:11

## 2017-02-13 RX ADMIN — Medication 650 MILLIGRAM(S): at 21:25

## 2017-02-13 RX ADMIN — AMLODIPINE BESYLATE 5 MILLIGRAM(S): 2.5 TABLET ORAL at 05:01

## 2017-02-13 RX ADMIN — Medication 100 MILLIGRAM(S): at 05:01

## 2017-02-13 RX ADMIN — Medication 1 APPLICATION(S): at 12:10

## 2017-02-14 PROCEDURE — 99233 SBSQ HOSP IP/OBS HIGH 50: CPT

## 2017-02-14 RX ADMIN — Medication 1 APPLICATION(S): at 12:03

## 2017-02-14 RX ADMIN — Medication 1 TABLET(S): at 12:04

## 2017-02-14 RX ADMIN — Medication 81 MILLIGRAM(S): at 12:06

## 2017-02-14 RX ADMIN — Medication 2: at 08:52

## 2017-02-14 RX ADMIN — Medication 12 UNIT(S): at 08:52

## 2017-02-14 RX ADMIN — ATORVASTATIN CALCIUM 10 MILLIGRAM(S): 80 TABLET, FILM COATED ORAL at 21:00

## 2017-02-14 RX ADMIN — FINASTERIDE 5 MILLIGRAM(S): 5 TABLET, FILM COATED ORAL at 12:03

## 2017-02-14 RX ADMIN — HEPARIN SODIUM 5000 UNIT(S): 5000 INJECTION INTRAVENOUS; SUBCUTANEOUS at 05:32

## 2017-02-14 RX ADMIN — CILOSTAZOL 50 MILLIGRAM(S): 100 TABLET ORAL at 05:32

## 2017-02-14 RX ADMIN — LOSARTAN POTASSIUM 50 MILLIGRAM(S): 100 TABLET, FILM COATED ORAL at 05:32

## 2017-02-14 RX ADMIN — Medication 100 MILLIGRAM(S): at 12:08

## 2017-02-14 RX ADMIN — Medication 100 MILLIGRAM(S): at 21:00

## 2017-02-14 RX ADMIN — CILOSTAZOL 50 MILLIGRAM(S): 100 TABLET ORAL at 17:55

## 2017-02-14 RX ADMIN — Medication 4: at 17:54

## 2017-02-14 RX ADMIN — Medication 500 MILLIGRAM(S): at 12:03

## 2017-02-14 RX ADMIN — Medication 650 MILLIGRAM(S): at 04:21

## 2017-02-14 RX ADMIN — HEPARIN SODIUM 5000 UNIT(S): 5000 INJECTION INTRAVENOUS; SUBCUTANEOUS at 17:55

## 2017-02-14 RX ADMIN — Medication 650 MILLIGRAM(S): at 20:59

## 2017-02-14 RX ADMIN — AMLODIPINE BESYLATE 5 MILLIGRAM(S): 2.5 TABLET ORAL at 05:32

## 2017-02-14 RX ADMIN — Medication 650 MILLIGRAM(S): at 05:21

## 2017-02-14 RX ADMIN — Medication 100 MILLIGRAM(S): at 05:32

## 2017-02-14 RX ADMIN — Medication 650 MILLIGRAM(S): at 21:30

## 2017-02-14 RX ADMIN — POLYETHYLENE GLYCOL 3350 17 GRAM(S): 17 POWDER, FOR SOLUTION ORAL at 21:00

## 2017-02-14 RX ADMIN — Medication 12 UNIT(S): at 17:54

## 2017-02-14 RX ADMIN — INSULIN GLARGINE 5 UNIT(S): 100 INJECTION, SOLUTION SUBCUTANEOUS at 21:57

## 2017-02-15 VITALS
RESPIRATION RATE: 18 BRPM | DIASTOLIC BLOOD PRESSURE: 65 MMHG | OXYGEN SATURATION: 99 % | TEMPERATURE: 98 F | SYSTOLIC BLOOD PRESSURE: 153 MMHG | HEART RATE: 79 BPM

## 2017-02-15 LAB
ANION GAP SERPL CALC-SCNC: 14 MMOL/L — SIGNIFICANT CHANGE UP (ref 5–17)
BUN SERPL-MCNC: 34 MG/DL — HIGH (ref 7–23)
CALCIUM SERPL-MCNC: 9.6 MG/DL — SIGNIFICANT CHANGE UP (ref 8.4–10.5)
CHLORIDE SERPL-SCNC: 98 MMOL/L — SIGNIFICANT CHANGE UP (ref 96–108)
CO2 SERPL-SCNC: 24 MMOL/L — SIGNIFICANT CHANGE UP (ref 22–31)
CREAT SERPL-MCNC: 0.96 MG/DL — SIGNIFICANT CHANGE UP (ref 0.5–1.3)
GLUCOSE SERPL-MCNC: 134 MG/DL — HIGH (ref 70–99)
HCT VFR BLD CALC: 31.6 % — LOW (ref 39–50)
HGB BLD-MCNC: 10.5 G/DL — LOW (ref 13–17)
INR BLD: 1.17 RATIO — HIGH (ref 0.88–1.16)
MCHC RBC-ENTMCNC: 29.1 PG — SIGNIFICANT CHANGE UP (ref 27–34)
MCHC RBC-ENTMCNC: 33.3 GM/DL — SIGNIFICANT CHANGE UP (ref 32–36)
MCV RBC AUTO: 87.5 FL — SIGNIFICANT CHANGE UP (ref 80–100)
PLATELET # BLD AUTO: 309 K/UL — SIGNIFICANT CHANGE UP (ref 150–400)
POTASSIUM SERPL-MCNC: 4.4 MMOL/L — SIGNIFICANT CHANGE UP (ref 3.5–5.3)
POTASSIUM SERPL-SCNC: 4.4 MMOL/L — SIGNIFICANT CHANGE UP (ref 3.5–5.3)
PROTHROM AB SERPL-ACNC: 12.8 SEC — SIGNIFICANT CHANGE UP (ref 10–13.1)
RBC # BLD: 3.61 M/UL — LOW (ref 4.2–5.8)
RBC # FLD: 15.4 % — HIGH (ref 10.3–14.5)
SODIUM SERPL-SCNC: 136 MMOL/L — SIGNIFICANT CHANGE UP (ref 135–145)
WBC # BLD: 11.4 K/UL — HIGH (ref 3.8–10.5)
WBC # FLD AUTO: 11.4 K/UL — HIGH (ref 3.8–10.5)

## 2017-02-15 PROCEDURE — 85730 THROMBOPLASTIN TIME PARTIAL: CPT

## 2017-02-15 PROCEDURE — 99285 EMERGENCY DEPT VISIT HI MDM: CPT | Mod: 25

## 2017-02-15 PROCEDURE — 82607 VITAMIN B-12: CPT

## 2017-02-15 PROCEDURE — 80048 BASIC METABOLIC PNL TOTAL CA: CPT

## 2017-02-15 PROCEDURE — 85027 COMPLETE CBC AUTOMATED: CPT

## 2017-02-15 PROCEDURE — G8978: CPT | Mod: CK

## 2017-02-15 PROCEDURE — 71045 X-RAY EXAM CHEST 1 VIEW: CPT

## 2017-02-15 PROCEDURE — 97162 PT EVAL MOD COMPLEX 30 MIN: CPT

## 2017-02-15 PROCEDURE — 84443 ASSAY THYROID STIM HORMONE: CPT

## 2017-02-15 PROCEDURE — 73620 X-RAY EXAM OF FOOT: CPT

## 2017-02-15 PROCEDURE — 80053 COMPREHEN METABOLIC PANEL: CPT

## 2017-02-15 PROCEDURE — 81001 URINALYSIS AUTO W/SCOPE: CPT

## 2017-02-15 PROCEDURE — 82652 VIT D 1 25-DIHYDROXY: CPT

## 2017-02-15 PROCEDURE — 73552 X-RAY EXAM OF FEMUR 2/>: CPT

## 2017-02-15 PROCEDURE — 93005 ELECTROCARDIOGRAM TRACING: CPT

## 2017-02-15 PROCEDURE — G8979: CPT | Mod: CH

## 2017-02-15 PROCEDURE — 83036 HEMOGLOBIN GLYCOSYLATED A1C: CPT

## 2017-02-15 PROCEDURE — 97116 GAIT TRAINING THERAPY: CPT

## 2017-02-15 PROCEDURE — 73502 X-RAY EXAM HIP UNI 2-3 VIEWS: CPT

## 2017-02-15 PROCEDURE — 97530 THERAPEUTIC ACTIVITIES: CPT

## 2017-02-15 PROCEDURE — 85610 PROTHROMBIN TIME: CPT

## 2017-02-15 PROCEDURE — 99239 HOSP IP/OBS DSCHRG MGMT >30: CPT

## 2017-02-15 RX ORDER — INSULIN GLARGINE 100 [IU]/ML
5 INJECTION, SOLUTION SUBCUTANEOUS
Qty: 0 | Refills: 0 | COMMUNITY

## 2017-02-15 RX ORDER — PETROLATUM,WHITE
1 JELLY (GRAM) TOPICAL
Qty: 0 | Refills: 0 | COMMUNITY
Start: 2017-02-15

## 2017-02-15 RX ORDER — METFORMIN HYDROCHLORIDE 850 MG/1
1 TABLET ORAL
Qty: 0 | Refills: 0 | COMMUNITY

## 2017-02-15 RX ORDER — INSULIN LISPRO 100/ML
12 VIAL (ML) SUBCUTANEOUS
Qty: 0 | Refills: 0 | COMMUNITY
Start: 2017-02-15

## 2017-02-15 RX ADMIN — Medication 1 APPLICATION(S): at 11:56

## 2017-02-15 RX ADMIN — Medication 500 MILLIGRAM(S): at 11:58

## 2017-02-15 RX ADMIN — AMLODIPINE BESYLATE 5 MILLIGRAM(S): 2.5 TABLET ORAL at 05:12

## 2017-02-15 RX ADMIN — Medication 1 TABLET(S): at 11:56

## 2017-02-15 RX ADMIN — HEPARIN SODIUM 5000 UNIT(S): 5000 INJECTION INTRAVENOUS; SUBCUTANEOUS at 05:12

## 2017-02-15 RX ADMIN — Medication 4: at 12:42

## 2017-02-15 RX ADMIN — Medication 81 MILLIGRAM(S): at 11:58

## 2017-02-15 RX ADMIN — Medication 2: at 08:31

## 2017-02-15 RX ADMIN — CILOSTAZOL 50 MILLIGRAM(S): 100 TABLET ORAL at 05:12

## 2017-02-15 RX ADMIN — Medication 100 MILLIGRAM(S): at 05:12

## 2017-02-15 RX ADMIN — LOSARTAN POTASSIUM 50 MILLIGRAM(S): 100 TABLET, FILM COATED ORAL at 05:12

## 2017-02-15 RX ADMIN — Medication 12 UNIT(S): at 08:32

## 2017-02-15 RX ADMIN — FINASTERIDE 5 MILLIGRAM(S): 5 TABLET, FILM COATED ORAL at 11:56

## 2017-02-15 RX ADMIN — Medication 100 MILLIGRAM(S): at 13:17

## 2017-02-27 ENCOUNTER — EMERGENCY (EMERGENCY)
Facility: HOSPITAL | Age: 82
LOS: 1 days | Discharge: HOME | End: 2017-02-27
Attending: EMERGENCY MEDICINE | Admitting: EMERGENCY MEDICINE
Payer: MEDICARE

## 2017-02-27 VITALS
SYSTOLIC BLOOD PRESSURE: 143 MMHG | HEART RATE: 69 BPM | DIASTOLIC BLOOD PRESSURE: 54 MMHG | TEMPERATURE: 98 F | RESPIRATION RATE: 18 BRPM | OXYGEN SATURATION: 98 %

## 2017-02-27 DIAGNOSIS — D64.9 ANEMIA, UNSPECIFIED: ICD-10-CM

## 2017-02-27 DIAGNOSIS — Z95.1 PRESENCE OF AORTOCORONARY BYPASS GRAFT: Chronic | ICD-10-CM

## 2017-02-27 DIAGNOSIS — Z79.4 LONG TERM (CURRENT) USE OF INSULIN: ICD-10-CM

## 2017-02-27 DIAGNOSIS — Z88.0 ALLERGY STATUS TO PENICILLIN: ICD-10-CM

## 2017-02-27 DIAGNOSIS — Z95.1 PRESENCE OF AORTOCORONARY BYPASS GRAFT: ICD-10-CM

## 2017-02-27 DIAGNOSIS — Z79.82 LONG TERM (CURRENT) USE OF ASPIRIN: ICD-10-CM

## 2017-02-27 DIAGNOSIS — E78.5 HYPERLIPIDEMIA, UNSPECIFIED: ICD-10-CM

## 2017-02-27 DIAGNOSIS — I25.10 ATHEROSCLEROTIC HEART DISEASE OF NATIVE CORONARY ARTERY WITHOUT ANGINA PECTORIS: ICD-10-CM

## 2017-02-27 DIAGNOSIS — I10 ESSENTIAL (PRIMARY) HYPERTENSION: ICD-10-CM

## 2017-02-27 DIAGNOSIS — E11.9 TYPE 2 DIABETES MELLITUS WITHOUT COMPLICATIONS: ICD-10-CM

## 2017-02-27 LAB
ALBUMIN SERPL ELPH-MCNC: 3.2 G/DL — LOW (ref 3.3–5)
ALP SERPL-CCNC: 90 U/L — SIGNIFICANT CHANGE UP (ref 40–120)
ALT FLD-CCNC: 11 U/L RC — SIGNIFICANT CHANGE UP (ref 10–45)
ANION GAP SERPL CALC-SCNC: 13 MMOL/L — SIGNIFICANT CHANGE UP (ref 5–17)
APTT BLD: 31.4 SEC — SIGNIFICANT CHANGE UP (ref 27.5–37.4)
AST SERPL-CCNC: 14 U/L — SIGNIFICANT CHANGE UP (ref 10–40)
BASOPHILS # BLD AUTO: 0 K/UL — SIGNIFICANT CHANGE UP (ref 0–0.2)
BASOPHILS NFR BLD AUTO: 0.2 % — SIGNIFICANT CHANGE UP (ref 0–2)
BILIRUB SERPL-MCNC: 0.2 MG/DL — SIGNIFICANT CHANGE UP (ref 0.2–1.2)
BLD GP AB SCN SERPL QL: POSITIVE — SIGNIFICANT CHANGE UP
BUN SERPL-MCNC: 48 MG/DL — HIGH (ref 7–23)
CALCIUM SERPL-MCNC: 8.9 MG/DL — SIGNIFICANT CHANGE UP (ref 8.4–10.5)
CHLORIDE SERPL-SCNC: 96 MMOL/L — SIGNIFICANT CHANGE UP (ref 96–108)
CO2 SERPL-SCNC: 24 MMOL/L — SIGNIFICANT CHANGE UP (ref 22–31)
CREAT SERPL-MCNC: 1.6 MG/DL — HIGH (ref 0.5–1.3)
EOSINOPHIL # BLD AUTO: 0.2 K/UL — SIGNIFICANT CHANGE UP (ref 0–0.5)
EOSINOPHIL NFR BLD AUTO: 1 % — SIGNIFICANT CHANGE UP (ref 0–6)
GLUCOSE SERPL-MCNC: 160 MG/DL — HIGH (ref 70–99)
HCT VFR BLD CALC: 27.7 % — LOW (ref 39–50)
HGB BLD-MCNC: 9 G/DL — LOW (ref 13–17)
INR BLD: 1.28 RATIO — HIGH (ref 0.88–1.16)
LYMPHOCYTES # BLD AUTO: 1.3 K/UL — SIGNIFICANT CHANGE UP (ref 1–3.3)
LYMPHOCYTES # BLD AUTO: 8.8 % — LOW (ref 13–44)
MCHC RBC-ENTMCNC: 28 PG — SIGNIFICANT CHANGE UP (ref 27–34)
MCHC RBC-ENTMCNC: 32.4 GM/DL — SIGNIFICANT CHANGE UP (ref 32–36)
MCV RBC AUTO: 86.4 FL — SIGNIFICANT CHANGE UP (ref 80–100)
MONOCYTES # BLD AUTO: 0.9 K/UL — SIGNIFICANT CHANGE UP (ref 0–0.9)
MONOCYTES NFR BLD AUTO: 6 % — SIGNIFICANT CHANGE UP (ref 2–14)
NEUTROPHILS # BLD AUTO: 12.6 K/UL — HIGH (ref 1.8–7.4)
NEUTROPHILS NFR BLD AUTO: 84 % — HIGH (ref 43–77)
PLATELET # BLD AUTO: 301 K/UL — SIGNIFICANT CHANGE UP (ref 150–400)
POTASSIUM SERPL-MCNC: 5.9 MMOL/L — HIGH (ref 3.5–5.3)
POTASSIUM SERPL-SCNC: 5.9 MMOL/L — HIGH (ref 3.5–5.3)
PROT SERPL-MCNC: 6.7 G/DL — SIGNIFICANT CHANGE UP (ref 6–8.3)
PROTHROM AB SERPL-ACNC: 13.9 SEC — HIGH (ref 10–13.1)
RBC # BLD: 3.21 M/UL — LOW (ref 4.2–5.8)
RBC # FLD: 15.9 % — HIGH (ref 10.3–14.5)
RH IG SCN BLD-IMP: POSITIVE — SIGNIFICANT CHANGE UP
SODIUM SERPL-SCNC: 133 MMOL/L — LOW (ref 135–145)
WBC # BLD: 15 K/UL — HIGH (ref 3.8–10.5)
WBC # FLD AUTO: 15 K/UL — HIGH (ref 3.8–10.5)

## 2017-02-27 PROCEDURE — 86077 PHYS BLOOD BANK SERV XMATCH: CPT

## 2017-02-27 PROCEDURE — 99284 EMERGENCY DEPT VISIT MOD MDM: CPT

## 2017-02-27 NOTE — ED PROVIDER NOTE - PHYSICAL EXAMINATION
Gen: Well appearing, NAD, AOx3  Head: NCAT  HEENT: MMM, normal conjunctiva  Lung: CTAB, no rales, rhonchi or wheezing  CV: S1/S2, no murmurs, rubs or gallops  Abd: soft, NTND, no rebound or guarding  Rectal: nontender, guaiac neg  MSK: No CVA tenderness. Left lower extremity in foam heal boot. Wound healing well, small yellowish discharge.  Neuro: No focal neurologic deficits. CN II-XII intact. Normal strength and sensation x 4  Skin: Warm and dry, no evidence of rash  Psych: normal mood and affect

## 2017-02-27 NOTE — ED PROVIDER NOTE - OBJECTIVE STATEMENT
89 y.o. male hx of DM, HTN, CAD with recent admission to hospital for left heal ulcer sp debridement discharged to rehab sent in for Low H/H of 8.3 down from 8.7, sent to eval for GI bleeding. Pt denies any symptoms at this time.

## 2017-02-27 NOTE — ED PROVIDER NOTE - MEDICAL DECISION MAKING DETAILS
89 y.o. male sent in for drop in H/H, not on anticoagulation, eval for Gi bleed. Guaiac neg, well appearing. Will check labs, reevaluate.

## 2017-02-27 NOTE — ED PROVIDER NOTE - NS ED ROS FT
ROS: denies HA, weakness, dizziness, fevers/chills, nausea/vomiting, chest pain, SOB, diaphoresis, abdominal pain, back/neck pain, dysuria/hematuria, or rash

## 2017-02-27 NOTE — ED ADULT NURSE NOTE - OBJECTIVE STATEMENT
Pt BIBA for evaluation of low H/H seen on labs at [facility].  Pt AXOX4, gross neuro intact.  Pt denies pain.  Pt calm, cooperative.  Hemoglobin 8.7.  Pt has ulceration on left heel.  Pt in rehab for PT s/p left hip replacement.  Pt has hx of CAD, DM, HLD, BPH.  Pt wearing heel protector on left foot.  Pt in no apparent distress.  Will continue to monitor. Pt BIBA for evaluation of low H/H seen on labs at [facility].  Pt AXOX4, gross neuro intact.  Pt denies pain.  Pt calm, cooperative.  Hemoglobin 8.7.  Pt has un stageable ulceration on left heel.  Pt in rehab for PT s/p left hip replacement.  Pt has hx of CAD, DM, HLD, BPH.  Pt wearing heel protector on left foot.  Pt in no apparent distress.  Pt also has stage 1 sacrum.  IV placed in left a/c 20 gauge.  VSS.  Will continue to monitor.

## 2017-02-28 VITALS
OXYGEN SATURATION: 96 % | HEART RATE: 65 BPM | TEMPERATURE: 98 F | RESPIRATION RATE: 18 BRPM | SYSTOLIC BLOOD PRESSURE: 152 MMHG | DIASTOLIC BLOOD PRESSURE: 56 MMHG

## 2017-02-28 LAB — DAT POLY-SP REAG RBC QL: NEGATIVE — SIGNIFICANT CHANGE UP

## 2017-02-28 PROCEDURE — 86850 RBC ANTIBODY SCREEN: CPT

## 2017-02-28 PROCEDURE — 99283 EMERGENCY DEPT VISIT LOW MDM: CPT | Mod: 25

## 2017-02-28 PROCEDURE — 86905 BLOOD TYPING RBC ANTIGENS: CPT

## 2017-02-28 PROCEDURE — 82272 OCCULT BLD FECES 1-3 TESTS: CPT

## 2017-02-28 PROCEDURE — 86880 COOMBS TEST DIRECT: CPT

## 2017-02-28 PROCEDURE — 86922 COMPATIBILITY TEST ANTIGLOB: CPT

## 2017-02-28 PROCEDURE — 85730 THROMBOPLASTIN TIME PARTIAL: CPT

## 2017-02-28 PROCEDURE — 85027 COMPLETE CBC AUTOMATED: CPT

## 2017-02-28 PROCEDURE — 85610 PROTHROMBIN TIME: CPT

## 2017-02-28 PROCEDURE — 80053 COMPREHEN METABOLIC PANEL: CPT

## 2017-02-28 PROCEDURE — 86900 BLOOD TYPING SEROLOGIC ABO: CPT

## 2017-02-28 PROCEDURE — 86901 BLOOD TYPING SEROLOGIC RH(D): CPT

## 2017-02-28 PROCEDURE — 86870 RBC ANTIBODY IDENTIFICATION: CPT

## 2017-02-28 RX ORDER — ACETAMINOPHEN 500 MG
650 TABLET ORAL ONCE
Qty: 0 | Refills: 0 | Status: COMPLETED | OUTPATIENT
Start: 2017-02-28 | End: 2017-02-28

## 2017-02-28 RX ADMIN — Medication 650 MILLIGRAM(S): at 00:45

## 2020-10-01 NOTE — ED PROVIDER NOTE - NS ED MD EM SELECTION
Otc Regimen: Shampoo with selenium sulfide and zinc pyrithione Detail Level: Zone Initiate Treatment: Bensal HP apply bid Plan: Discussed Humira if conditions get worse\\nRecommend bleach bath or chlorine pool [phone] Comprehensive

## 2021-10-06 NOTE — PATIENT PROFILE ADULT. - FUNCTIONAL SCREEN CURRENT LEVEL: COMMUNICATION, MLM
normal appearance , without tenderness upon palpation , no deformities , trachea midline , Thyroid normal size , no thyroid nodules , no masses , no JVD , thyroid nontender (0) understands/communicates without difficulty

## 2022-03-31 NOTE — ED ADULT NURSE NOTE - FALLEN IN THE PAST
If no new symptoms develop but Francisco J continues tomorrow with fever more than 101, please take him to the emergency room for further work up.  
no

## 2022-07-07 NOTE — ED ADULT NURSE NOTE - PT NEEDS ASSIST
yes
Advance activity as tolerated.  Continue all previously prescribed medications as directed unless otherwise instructed.  Take Tylenol 650mg (Two 325 mg pills) every 4-6 hours as needed for pain or fevers.  Take Flexeril 5 mg every 8 hours as needed for muscle spasm -- causes drowsiness; no drinking alcohol or driving with this medication.  Follow up with your primary care physician and neurology (referral list provided or you may follow up in the clinic, please call [phone])  in 48-72 hours- bring copies of your results.  Return to the ER for worsening or persistent symptoms, including but not limited to worsening/persistent headache, numbness, weakness, changes in vision or hearing, dizziness, difficulty walking, falls, slurred speech, neck stiffness, fevers, lightheadedness and/or ANY NEW OR CONCERNING SYMPTOMS. If you have issues obtaining follow up, please call: [phone] to obtain a doctor or specialist who takes your insurance in your area.  You may call [phone] to make an appointment with the internal medicine clinic.

## 2023-01-02 NOTE — ED ADULT NURSE NOTE - NS ED NURSE LEVEL OF CONSCIOUSNESS ORIENTATION
[facility]   DISCHARGE SUMMARY        MRN: [id number]    GENERAL INFORMATION:  - Admission Date/Time: 1/1/2023  1:11 AM  - Discharge Date/Time:  1/2/22  - PCP: Katie TORRES MD  notified via Perfect Serve/Epic if available    Discharge Diagnoses:  TIA (transient ischemic attack)     Hospital Course:  History Of Present Illness:  Patient is a 53yoM with PMH significant for previous CVA with residual left sided weakness, who presents to the hospital with right sided weakness that started about 1 hour prior to coming to the hospital. He was showering when he noted numbness and tingling as well as weakness (right arm and leg). The symptoms have been improving since coming in. No other associated symptoms.The patient was drinking prior to the event during NYE, otherwise did not use any other drugs and does not smoke.      Pt with hypokalemia, replacement given but was still low this morning. Will need follow up for potassium levels as outpatient  MRI negative.  Echocardiogram completed this morning.  Patient to follow-up with cardiology as outpatient as well as neurology.    Blood pressure elevated, heart rate low in the 40s to 50s.  Beta-blocker held.  Patient's hydralazine dose increased to 50 from 25.  Patient to follow-up with cardiology as outpatient as well as PCP and neurology.    Right Sided Weakness, resolved  - resolving  - potentially TIA  - transient and resolving  - neurology to see  - MRI brain negative  - discussed with neuro pt is on aspirin and plavix and should continue this regimen    Hypokalemia  - replacement given  - l repeat labs outpatient  - home dose given 20mEq daily for 7 days only    History of CVA with LEFT sided weakness  - cont preventative therapies  - pt only on plavix  - consider adding aspirin? Will discuss with neuro     Hypertensive Emergency  - weakness noted  - BP up to 180s systolic in hospital  - home meds restarted     Paroxysmal Atrial Fibrillation  - not on anticoag  -  currently bradycardic  - cardiology consulted    Bradycardia  - hold BB  - pt to follow up with cardiology  - needs close monitoring due to hx afib  - sent message to PCP also to ensure follow up     Hypertension   - currently controlled  - continue home meds with parameters  - avoid hypotension  - cont outpatient follow up for BP with cardiology and pcp     Hyperlipidemia  - cont statin     Type II Diabetes  - not on therapies at home, diet and exercise only  - ISS and long acting lantus orderedas appropriate  - glucose accuchecks  - will modify insulin as needed     Obesity BMI 31  - weight loss counseling     Sent update to PCP:   pt came in with left sided weakness which resolved. MRI negative for stroke. echo done, results pending. BP higher end and also with bradycardia in 50s. we held metoprolol and increased hydralazine. will need close follow up for BP. Also, pt hypokalemic despite replacement. Given conservative replacement for next 7 days with outpatient lab in one week. His level today was 3.2 and getting total 80mEq replacement    Discharge Summary Plan   Discharge Status: improved. Medically stable.  Discharge instructions given: to patient   Discharge location: discharge to HOME  Discharge activity: as tolerated  Discharge diet: as tolerated   Prescriptions: continue home medications with no change.  New scripts as per EMR       Education and Follow-up   Counseled: patient, family, regarding diagnosis.       Follow up:  MD Jazlyn Love MD  Neurology [phone] [address] [place] [address]      Next Steps: Schedule an appointment as soon as possible for a visit  Appointment:   Instructions: follow up within 6 weeks    MD Katie Woods MD PCP - [facility] Family Practice [phone] [address] [place] [address]     Next Steps: Follow up in 1 week(s)  Appointment:   Instructions:     MD Arjun Bagley MD   Internal Medicine - Clinical Cardiac Electrophysiology [phone] [address] [facility] [address] [facility] [address]     Next Steps: Follow up in 1 month(s)         DISCHARGE INSTRUCTIONS  I have  reviewed all medications with patient/family and reviewed potential side effects. Patient/family  is to seek medical attention immediately should symptoms recurr, worsen, or if any other problems/abnormalities arise. Patient/family understands these instructions. Is to follow up with PCP as noted above. All specialists and consultants as noted. Patient/family educated about compliance with medications and expectations for the course of treatment.   Patient was thoroughly informed regarding new medications and possible interactions/adverse effects, recommendations from consultants and specialists. Pt was also instructed to call 911 and/or report to the emergency room should symptoms recurr or if any other problems arise.     PHYSICAL EXAM:  Visit Vitals  BP (!) 157/94   Pulse (!) 50   Temp 97.7 °F (36.5 °C) (Oral)   Resp 16   Ht 5' 11\" (1.803 m)   Wt 102.8 kg (226 lb 10.1 oz)   SpO2 97%   BMI 31.61 kg/m²       Physical Exam:  General: Alert and oriented, no acute distress  Eyes: no scleral icterus, no conjunctival erythema   Cardio: S1, S2, RRR, no murmur, rub, gallop or thrills noted.   Pulm: Lungs clear to auscultation bilaterally, no wheeze or rhonchi noted. No chest wall tenderness  GI: Soft, non-tender, nondistended. Normal bowel sounds auscultated x4 quadrants  : No suprapubic Tenderness, no CVA tenderness bilaterally  Ext: No upper or lower extremity edema noted. No cords palpated.   Musculoskeletal: 5/5 strength both upper and lower extremities. No joint tenderness or erythema.  Skin: No abnormal bruising or discoloration noted. No jaundice.   Psych: Appropriate mood and affect. Good Insight and Judgment  Neuro: No focal motor or sensory deficits noted. Pt appropriately follows commands.    Lab  Results:  Recent Labs   Lab 01/02/23  0259 01/01/23  1901 01/01/23  1343 01/01/23  0125   SODIUM 139  --   --  139   POTASSIUM 3.2* 3.2* 3.2* 3.4   CHLORIDE 102  --   --  103   CO2 33*  --   --  24   BUN 16  --   --  22*   CREATININE 0.97  --   --  1.37*   GLUCOSE 99  --   --  119*   ALBUMIN  --   --   --  3.5*   AST  --   --   --  19   BILIRUBIN  --   --   --  0.3   TSH  --   --  0.890  --        Imaging:    MRI BRAIN WO CONTRAST   Final Result      1.   No evidence of acute cerebral infarction or other acute brain   abnormality.   2.   Large chronic infarct in the right middle cerebral artery territory   with chronic hemorrhages.      Electronically Signed by: MARTINEZ JEFF MD    Signed on: 1/1/2023 3:54 PM          CT CEREBRAL PERFUSION W CONTRAST LEVEL 1   Final Result   Addendum (preliminary) 2 of 2   ADDENDUM:   01/01/23 02:47 Call Doctor Regarding Stroke, called  Dr. Braga on 01/01    02:47 (-06:00)            Final      Matched abnormalities in CBF and T-max seen in the region of the previous,    all right MCA infarct.      No evidence of acute infarct.      Small area of increased T-max in the left frontal lobe which may    represent an area of hypoperfusion.            Communications:       Call Doctor Stroke      Electronically signed by Carl Brumfield MD on 01 01 23 at 02:34      CTA HEAD AND NECK W CONTRAST LEVEL 1   Final Result   Addendum (preliminary) 1 of 1   ADDENDUM:   01/01/23 03:00 Call Doctor Regarding Stroke, called  Dr. Braga on 01/01    02:47 (-06:00)            Final      Attenuated distal branches of the right middle cerebral artery in the    region of an old infarct.      No central intracranial occlusion.      _______________________________________________      IMPRESSION:           No occlusion or severe stenosis.               Communications:       Call Doctor Stroke      Electronically signed by Carl Brumfield MD on 01 01 23 at 02:45      CT HEAD LEVEL 1   Final Result    Addendum (preliminary) 1 of 1   ADDENDUM:   01/01/23 01:50 Call Doctor Regarding Stroke, called  Dr. Braga on 01/01    01:50 (-06:00)            Final     No acute hemorrhage, hydrocephalus, or mass effect.  Old right MCA    territory infarct.               Communications:       Call Doctor Stroke      Electronically signed by Beau Montes MD on 01 01 23 at 01:42           Pathology/CX results:  * Cannot find OR log *    Microbiology Results     None           Discharge Medications:       Summary of your Discharge Medications      Take these Medications      Details   aspirin 81 MG EC tablet   Take 1 tablet by mouth daily.     atorvastatin 80 MG tablet  Commonly known as: LIPITOR   Take 80 mg by mouth every evening.     baclofen 10 MG tablet  Commonly known as: LIORESAL   Take 10 mg by mouth 3 times daily as needed.     clopidogrel 75 MG tablet  Commonly known as: PLAVIX   Take 75 mg by mouth daily.     FLUoxetine 20 MG capsule  Commonly known as: PROzac   Take 20 mg by mouth daily.     hydroCHLOROthiazide 25 MG tablet  Commonly known as: HYDRODIURIL   Take 2 tablets by mouth daily.     lisinopril 40 MG tablet  Commonly known as: ZESTRIL   Take 40 mg by mouth daily.     Multivitamin Tab   Take 2 tablets by mouth daily.     NIFEdipine CC 90 MG 24 hr tablet  Commonly known as: ADALAT CC   Take 90 mg by mouth daily.     trazodone 150 MG tablet  Commonly known as: DESYREL   Take 150 mg by mouth nightly.              Primary Care Physician:  Katie TORRES MD      I spent 35 minutes on the discharge.  This includes the following: Reviewed all vitals, medications, new orders, I/O, labs, micro, radiology, nurses notes, pertinent consultant notes, as well as discussing patient's diagnosis and plan of care.     Primary care physician notified.        Kena Dennison MD    [facility] Hospitalist  1/2/2023 9:10 AM       Oriented - self; Oriented - place; Oriented - time

## 2023-03-24 NOTE — DIETITIAN INITIAL EVALUATION ADULT. - NS FNS REASON FOR WEIGHT CHANG
[FreeTextEntry2] : Weigh-in
decreased po intake/weight lost following initial hip fracture and rehab stay as stated by patient

## 2023-12-29 NOTE — DISCHARGE NOTE ADULT - CARE PROVIDERS DIRECT ADDRESSES
DirectAddress_Unknown,DirectAddress_Unknown